# Patient Record
(demographics unavailable — no encounter records)

---

## 2024-12-02 NOTE — ASSESSMENT
[FreeTextEntry1] : Reviewed and reconciled medications, allergies, PMHx, PSHx, SocHx, FMHx.  Patient presents today stating that he has difficulty breathing through his nose R>L. He states that he has a PND. He states that he has a history of asthma and has been using his Albuterol more frequently lately. He states that this difficulty breathing has made him have trouble sleeping. He states that he has broken his nose in the past. He denies using Vitamin C for a long time. He denies recent epistaxis. He states that he had a CT scan of his nose last week with Dr. Henriquez. He states that he had a COVID vaccine. He denies a history of problems with anesthesia. he denies taking Aspirin or blood thinners.   Physical exam: -NOSE score 50 -TNSS 7 -ears are clean and clear bilaterally -no lateralization to tuning forks -severely deviated septum - along the right maxillary crest and along the posterior septum -external nasal deformity -large septal perforation -class 1 tonsils -webbing between the uvula and the tonsil beds  ENT Procedure:  Flexible nasal endoscopy -thick mucoid secretions intranasally on the left -enlarged left inferior turbinate -deviated septum right both inferior and posteriorly to the septal perforation   Plan: -Retrieve results from CT scan -Discussed septoplasty and submucous resection of bilateral inferior turbinates pending results from CT scan. R/b/a discussed. Risk of septoplasty and turbinate surgery were discussed with the patient including but not limited to bleeding, infection, septal perforation, empty nose syndrome, numbness of the front two teeth, and loss of sense of smell.  All questions were answered. -Discussed possible closure of septal perforation and open nasal reconstruction with Dr. Henriquez pending results from CT scan -FU pending results from CT scan

## 2024-12-02 NOTE — CONSULT LETTER
[Dear  ___] : Dear  [unfilled], [Consult Letter:] : I had the pleasure of evaluating your patient, [unfilled]. [Please see my note below.] : Please see my note below. [Consult Closing:] : Thank you very much for allowing me to participate in the care of this patient.  If you have any questions, please do not hesitate to contact me. [Sincerely,] : Sincerely, [FreeTextEntry3] :  Milo Nichols MD FACS

## 2024-12-02 NOTE — PROCEDURE
[Recalcitrant Symptoms] : recalcitrant symptoms  [FreeTextEntry6] :  Procedure: Flexible Nasal Endoscopy: Risks, benefits, and alternatives of flexible endoscopy were explained to the patient. The patient gave oral consent to proceed. The flexible scope was inserted into the right nasal cavity. Endoscopy of the inferior and middle meatus was performed. No polyp, mass, or lesion was appreciated. Olfactory cleft was clear. Spheno-ethmoid recess is clear. Nasopharynx was clear. Turbinates were without mass, but enlarged left inferior turbinate. The procedure was repeated on the contralateral side with similar findings. There were thick mucoid secretions intranasally on the left. There was a deviated septum right both inferior and posteriorly to the septal perforation. -thick mucoid secretions intranasally on the left -enlarged left inferior turbinate -deviated septum right both inferior and posteriorly to the septal perforation

## 2024-12-02 NOTE — REVIEW OF SYSTEMS
[Post Nasal Drip] : post nasal drip [Ear Noises] : ear noises [Nasal Congestion] : nasal congestion [Throat Clearing] : throat clearing [Wheezing] : wheezing [Cough] : cough [Joint Pain] : joint pain [Negative] : Heme/Lymph

## 2024-12-02 NOTE — PHYSICAL EXAM
[Hearing Zarco Test (Tuning Fork On Forehead)] : no lateralization of tone [] : septum deviated to the right [Midline] : trachea located in midline position [Normal] : no rashes [Hearing Loss Right Only] : normal [Hearing Loss Left Only] : normal [de-identified] : external nasal deformity [de-identified] : large septal perforation [de-identified] : class 1 [de-identified] : webbing between the uvula and the tonsil beds

## 2024-12-02 NOTE — ADDENDUM
[FreeTextEntry1] :  Documented by César Concepcion acting as scribe for Dr. Nichols on 12/02/2024. All Medical record entries made by the Scribe were at my, Dr. Nichols, direction and personally dictated by me on 12/02/2024 . I have reviewed the chart and agree that the record accurately reflects my personal performance of the history, physical exam, assessment and plan. I have also personally directed, reviewed, and agreed with the discharge instructions.

## 2024-12-02 NOTE — HISTORY OF PRESENT ILLNESS
[de-identified] : Patient presents today stating that he has difficulty breathing through his nose R>L. He states that he has a PND. He states that he has a history of asthma and has been using his Albuterol more frequently lately. He states that this difficulty breathing has made him have trouble sleeping. He states that he has broken his nose in the past. He denies using Vitamin C for a long time. He denies recent epistaxis. He states that he had a CT scan of his nose last week with Dr. Henriquez. He states that he had a COVID vaccine. He denies a history of problems with anesthesia. he denies taking Aspirin or blood thinners.

## 2025-06-19 NOTE — REASON FOR VISIT
[Consultation] : a consultation visit [FreeTextEntry1] : Acid Reflux evaluation for esophageal cancer

## 2025-06-19 NOTE — HISTORY OF PRESENT ILLNESS
[FreeTextEntry1] : 43 year old male presents with complaints of intermittent episodes of acid reflux. He has a family hx of esophageal cancer. Patient states he is not taking medication at this time for management of symptoms. He is making dietary changes. He also experiences intermittent episodes. of constipation and rectal discomfort. Denies rectal bleeding, blood in the stool, melena, or hematemesis.

## 2025-06-19 NOTE — ASSESSMENT
[FreeTextEntry1] : A low acid / reflux diet was discussed in great detail including not smoking, not drinking alcohol, and not consuming foods that irritate the esophagus. It is helpful to eat small meals throughout the day instead of large meals. You should avoid eating before bedtime or lying down after you eat. It can be helpful to raise the head of your bed six inches. Additionally, you should maintain a healthy weight and good posture.. The patient was given written material to take home and review.   The causes of constipation were discussed at length We discussed : Eat three meals each day. Do not skip meals. Gradually increase the amount of FIBER in your diet. Choose more whole grain breads, cereals and rice. Select more raw fruits and vegetables eat the peel, if appropriate. Read food labels and look for the "dietary fiber" content of foods. Good sources have 2 grams of fiber or more. Drink six to eight glasses of water each day. Limit highly refined and processed foods.  Patient will need ECO The risks benefits alternatives and complications of the procedure/s were explained to the patient at length. The patient was agreeable and we will proceed. Preparation for procedure discussed reviewed side effects and adverse reactions.   Patient at this time does not want to take medications for constipation or acid reflux.   Patient verbalized understanding of all information provided. All questions answered and reviewed.  I spent 45 minutes with the patient as well as reviewing documents prior to and after the office visit

## 2025-06-19 NOTE — PHYSICAL EXAM
[Alert] : alert [Normal Voice/Communication] : normal voice/communication [Healthy Appearing] : healthy appearing [No Acute Distress] : no acute distress [Sclera] : the sclera and conjunctiva were normal [Hearing Threshold Finger Rub Not Edwards] : hearing was normal [Normal Lips/Gums] : the lips and gums were normal [Oropharynx] : the oropharynx was normal [Normal Appearance] : the appearance of the neck was normal [No Neck Mass] : no neck mass was observed [No Respiratory Distress] : no respiratory distress [No Acc Muscle Use] : no accessory muscle use [Respiration, Rhythm And Depth] : normal respiratory rhythm and effort [Auscultation Breath Sounds / Voice Sounds] : lungs were clear to auscultation bilaterally [Heart Rate And Rhythm] : heart rate was normal and rhythm regular [Normal S1, S2] : normal S1 and S2 [Murmurs] : no murmurs [Bowel Sounds] : normal bowel sounds [No Masses] : no abdominal mass palpated [Abdomen Soft] : soft [] : no hepatosplenomegaly [Epigastric] : epigastric [Oriented To Time, Place, And Person] : oriented to person, place, and time